# Patient Record
Sex: FEMALE | Race: WHITE | NOT HISPANIC OR LATINO
[De-identification: names, ages, dates, MRNs, and addresses within clinical notes are randomized per-mention and may not be internally consistent; named-entity substitution may affect disease eponyms.]

---
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
## 2022-11-23 ENCOUNTER — APPOINTMENT (OUTPATIENT)
Dept: GASTROENTEROLOGY | Facility: CLINIC | Age: 22
End: 2022-11-23

## 2022-11-23 VITALS
DIASTOLIC BLOOD PRESSURE: 80 MMHG | SYSTOLIC BLOOD PRESSURE: 102 MMHG | HEART RATE: 101 BPM | BODY MASS INDEX: 15.95 KG/M2 | OXYGEN SATURATION: 98 % | RESPIRATION RATE: 12 BRPM | TEMPERATURE: 98.5 F | WEIGHT: 90 LBS | HEIGHT: 63 IN

## 2022-11-23 DIAGNOSIS — R89.4 ABNORMAL IMMUNOLOGICAL FINDINGS IN SPECIMENS FROM OTHER ORGANS, SYSTEMS AND TISSUES: ICD-10-CM

## 2022-11-23 PROCEDURE — 99215 OFFICE O/P EST HI 40 MIN: CPT

## 2022-11-23 RX ORDER — NORETHINDRONE ACETATE AND ETHINYL ESTRADIOL, ETHINYL ESTRADIOL AND FERROUS FUMARATE 1MG-10(24)
1 MG-10 MCG / KIT ORAL
Refills: 0 | Status: COMPLETED | COMMUNITY
End: 2022-11-23

## 2022-11-23 NOTE — HISTORY OF PRESENT ILLNESS
Name: Stephane Rios  YOB: 1944  Gender: female  MRN: [id number]    CC:   Chief Complaint   Patient presents with    Shoulder Pain     Recheck left shoulder        HPI: Patient comes in for recheck of left shoulder pain. Patient had biceps tendon injection on 9-2-2022 and notes 3-4 weeks relief with injection. Patient has also previously had a subacromial injection on 7-9-2022 with only minor relief. Her last visit she also noted some cervical spine pain with history of neck surgery. The patient notes posterior and lateral pain. Denies popping, clicking, numbness and tingling. Allergies   Allergen Reactions    Fire Ant Anaphylaxis    Amoxicillin Other (See Comments)     C-Diff    Amoxicillin-Pot Clavulanate Other (See Comments)     c.diff    Augmentin [Amoxicillin-Pot Clavulanate]     Cephalexin Other (See Comments)     C Diff      Cephalexin Other (See Comments)     C-Diff  C-Diff      Cephalosporins     Hydromorphone Other (See Comments)     Keeps her awake    Meperidine Hcl     Morphine Other (See Comments)     Awake  Awake  No sleep    Oxycodone     Oxycodone-Acetaminophen Other (See Comments)     Jittery  Nightmares    Oxycodone-Aspirin Other (See Comments)     Jittery  Other reaction(s): Unknown-Unspecified  Jittery    Penicillins Other (See Comments)     C-Diff      Povidone Iodine     Povidone-Iodine Other (See Comments)     Johnston  Johnston  burn    Adhesive Tape Itching and Rash     \"sticky ace wrap\"  \"sticky ace wrap\"    Meperidine Rash     Past Medical History:   Diagnosis Date    Acute kidney insufficiency     Followed by PCP     Adverse effect of anesthesia     2016 in [place] heart stopped at the end of my gallbladder surgery. \" Patient reports no CPR performed, stayed one night in the hospital. No problems with any other surgeries.     Allergic rhinitis     Anemia     history     Anxiety and depression     Controlled with medication     Arthritis     Edema     bilateral lower legs     GERD (gastroesophageal reflux disease)     Controlled with medication     History of chicken pox     History of Clostridioides difficile infection     History of DVT (deep vein thrombosis)     s/p knee replacement and developed right leg DVT     HTN (hypertension)     controlled with medication     Hyperlipidemia     controlled with medication     Hyperlipidemia     Hypertension     Hyperthyroidism     pt denies    Hypothyroidism     controlled with medication     Hypothyroidism     IBS (irritable bowel syndrome)     Insomnia     Left flank pain 3/5/2020    Morbid obesity ([facility] [address]     BMI 42.85    Murmur     per PCP note (26/0/71) systolic murmur grade of 2/6    Overactive bladder     PAD (peripheral artery disease) ([facility] [address]     patient denies on 11/27/19    Recurrent UTI     Restless leg     RLS (restless legs syndrome)     controlled with medication     SOBOE (shortness of breath on exertion)     PRN inhaler-patient reports she uses inhaler QHS    Special examinations     History-Neurologist - Dr. Larry OCONNOR (stress urinary incontinence, female)     Thromboembolus ([facility] [address] 2015    behind right knee after total knee replacement    Tubulovillous adenoma of colon 04/26/2021     Past Surgical History:   Procedure Laterality Date    APPENDECTOMY  1955    CHOLECYSTECTOMY  12/2016    COLONOSCOPY      COLONOSCOPY      2013, 2003    GYN  12/03/2019    suburethral sling and cystoscopy    HYSTERECTOMY, TOTAL ABDOMINAL (CERVIX REMOVED)      1970    IR GENERIC PROCEDURE      2014    KNEE SURGERY Left     4/2016    KNEE SURGERY Left 2014    LAP BAND  REMOVAL 3/16/2015    ORTHOPEDIC SURGERY Left 01/2020    left hand tendon surgery    ORTHOPEDIC SURGERY Left     LEFT WRIST TRAPEZIOMETACARPAL ARTHROPLASTY WITH FCR TENDON TRANSFER- 2020    OTHER SURGICAL HISTORY Right 2016    Hand    OTHER SURGICAL HISTORY      2014    OTHER SURGICAL HISTORY      1986    OTHER SURGICAL HISTORY  12/03/2019    bladder surgery-put mesh in SHOULDER ARTHROSCOPY      1966,     TONSILLECTOMY AND ADENOIDECTOMY      1950    TOTAL KNEE ARTHROPLASTY Right 2014     Family History   Problem Relation Age of Onset    Other Sister     Heart Disease Brother     No Known Problems Mother     Other Father 54        brain aneurysm    Cerebral Aneurysm Father     Congenial Anomalies Sister         right arm deformity    Pneumonia Sister     Other Sister         fibromyalgia    Hypertension Brother     Heart Disease Brother     Other Brother         brain aneurysm, AVM    Heart Surgery Brother         CABGx4    Heart Attack Maternal Grandmother     Cancer Maternal Grandfather         skin    Heart Attack Paternal Grandfather     Mult Sclerosis Daughter     Drug Abuse Son     Other Son         Hepatitis C     Social History     Socioeconomic History    Marital status:      Spouse name: Aydin Katz    Number of children: 2    Years of education: Not on file    Highest education level: Not on file   Occupational History    Occupation: Previously worked as a [employer]    Tobacco Use    Smoking status: Former     Packs/day: 1.00     Years: 46.00     Pack years: 46.00     Types: Cigarettes     Quit date: 2005     Years since quittin.8    Smokeless tobacco: Never   Vaping Use    Vaping Use: Unknown   Substance and Sexual Activity    Alcohol use: No     Alcohol/week: 0.0 standard drinks    Drug use: No    Sexual activity: Not on file   Other Topics Concern    Not on file   Social History Narrative    ** Merged History Encounter **         Patient has 1 dog sleeps with her occasionally. Patient currently drinks 1 caffeine drink per day.      Social Determinants of Health     Financial Resource Strain: Not on file   Food Insecurity: Not on file   Transportation Needs: Not on file   Physical Activity: Not on file   Stress: Not on file   Social Connections: Not on file   Intimate Partner Violence: Not on file   Housing Stability: Not on file        No flowsheet [FreeTextEntry1] : The patient comes concerned that she is losing weight.  She is being very mindful about avoiding gluten.  She denies heartburn, odynophagia, dysphagia or satiety.  She is moving her bowels on a regular basis without rectal bleeding.  She has no rashes.  She occasionally has headaches but less so than prior to her gluten-free diet. [de-identified] : The patient moves bowels daily. Denies any abdominal pain, constipation, diarrhea, bright red blood per rectum.  She has been having trouble gaining weight and went for celiac's through antibody testing that were positive.  She has been on a gluten-free diet, gaining weight with improved skin quality.\par \par No heartburn, odynophagia, dysphagia or early satiety.\par \par No history of anemia or abnormal liver enzymes.\par \par Due for screening colonoscopy. data found. Review of Systems  Non-contributory    PE left shoulder:      Left shoulder is examined. She has lost some external rotation. A little bit of tightness with flexion extension. Strength is appropriate. Recall Recall MRI left shoulder performed 7/5/22:     Narrative   MRI OF THE LEFT SHOULDER WITHOUT CONTRAST. Clinical Indication: Left shoulder pain with limited range of motion       Procedure: Multiplanar and multisequence MR images of the left shoulder were   performed without gadolinium contrast.       Comparison: No prior       Findings:        AC joint: Mild degenerative hypertrophy is noted of the AC joint. There is trace   fluid signal the subacromial/subdeltoid bursa. Rotator cuff: The supraspinatus, infraspinatus, teres minor, and subscapularis   tendons are intact. No rotator cuff tendon tear evident. Biceps labral complex: The long head of biceps tendon is intact. The superior   labrum is intact. The joint capsule in axillary recess is intact. Glenohumeral joint: The marrow signal in the humeral head and glenoid is   unremarkable. No definite anterior or posterior labral tear present. There is   mild degenerative chondral fissuring along the central glenoid. A small joint   effusion with synovitis is present. No abnormal soft tissue mass or fatty atrophy. Impression   1. Mild osteoarthritic chondral changes along the glenoid   2. Small glenohumeral joint effusion with synovitis. 3. The rotator cuff tendon is intact. 4. Mild degenerative hypertrophy the [facility] joint with underlying   subacromial/subdeltoid bursitis. MRI reviewed, AC OA noticed. No RTC tear. Synovitis noted in the axillary space on coronal image 10. A/Plan:     ICD-10-CM    1. Left shoulder pain, unspecified chronicity  M25.512       2. Tear of left rotator cuff, unspecified tear extent, unspecified whether traumatic  M75.102       3.  Tendinopathy of left biceps  [id number]            We discussed multiple exercises to continue to work on including stretching. She has some lack of motion. Discussed if she is at therapy with her  asked them for some exercises as well. She said she had 4 weeks of 100% relief with the last injection. Also discussed appropriate use of NSAIDs with her orally or topically. I think topically would probably work better so she will try this over the next week. Return in about 6 weeks (around 12/2/2022).         Carrie Paulino MD  10/21/22

## 2022-11-23 NOTE — PHYSICAL EXAM
[General Appearance - Alert] : alert [General Appearance - In No Acute Distress] : in no acute distress [Sclera] : the sclera and conjunctiva were normal [PERRL With Normal Accommodation] : pupils were equal in size, round, and reactive to light [Extraocular Movements] : extraocular movements were intact [Outer Ear] : the ears and nose were normal in appearance [Neck Appearance] : the appearance of the neck was normal [Thyroid Diffuse Enlargement] : the thyroid was not enlarged [Thyroid Nodule] : there were no palpable thyroid nodules [Auscultation Breath Sounds / Voice Sounds] : lungs were clear to auscultation bilaterally [Heart Rate And Rhythm] : heart rate was normal and rhythm regular [Heart Sounds Gallop] : no gallops [Heart Sounds] : normal S1 and S2 [Murmurs] : no murmurs [Heart Sounds Pericardial Friction Rub] : no pericardial rub [Edema] : there was no peripheral edema [Bowel Sounds] : normal bowel sounds [Abdomen Soft] : soft [Abdomen Tenderness] : non-tender [] : no hepato-splenomegaly [Abdomen Mass (___ Cm)] : no abdominal mass palpated [Cervical Lymph Nodes Enlarged Posterior Bilaterally] : posterior cervical [Cervical Lymph Nodes Enlarged Anterior Bilaterally] : anterior cervical [No CVA Tenderness] : no ~M costovertebral angle tenderness [No Spinal Tenderness] : no spinal tenderness [Nail Clubbing] : no clubbing  or cyanosis of the fingernails [Abnormal Walk] : normal gait [Musculoskeletal - Swelling] : no joint swelling seen [Motor Tone] : muscle strength and tone were normal [Skin Color & Pigmentation] : normal skin color and pigmentation [No Focal Deficits] : no focal deficits [Impaired Insight] : insight and judgment were intact [Oriented To Time, Place, And Person] : oriented to person, place, and time [Affect] : the affect was normal

## 2022-11-23 NOTE — ASSESSMENT
[FreeTextEntry1] : My impression is that of a young woman having difficulty gaining weight but without any other symptoms with celiac sprue, despite carefully avoiding gluten.\par \par I have spent a great deal of time discussing the role of daily high-intensity exercise with the patient. We discussed behavior modification strategies to institute this habit.   I have discussed nutrition in great detail including consuming vegetable fibers on a daily basis and limiting simple carbohydrates 5 days per week.  We have also reviewed the benefits of soluble fiber supplementation, including (but not limited to), favorable effects on lipid profile, weight control and the salutary effects on colonic microbiota. We reviewed the effects of such daily habits on metabolism and the metabolic set point resulting in a healthy weight, decreased pain sensitivity (effecting the GI tract), bowel habits and other aspects of health.  I recommended a mindful meditation practice and reviewed behavior modification strategies.\par \par Celiac sprue antibodies today.\par \par I have asked the patient to schedule an upper endoscopy in the near future. I have reviewed the risks, benefits and alternatives, answered all questions and provided the patient with literature to read.

## 2022-11-28 LAB
GLIADIN IGA SER QL: 19.3 UNITS
GLIADIN IGG SER QL: 7.4 UNITS
GLIADIN PEPTIDE IGA SER-ACNC: NEGATIVE
GLIADIN PEPTIDE IGG SER-ACNC: NEGATIVE
IGA SER QL IEP: 124 MG/DL
TTG IGA SER IA-ACNC: 12.5 U/ML
TTG IGA SER-ACNC: POSITIVE
TTG IGG SER IA-ACNC: 14.6 U/ML
TTG IGG SER IA-ACNC: POSITIVE

## 2022-11-30 LAB
ENDOMYSIUM IGA SER QL: NEGATIVE
ENDOMYSIUM IGA TITR SER: NORMAL

## 2025-03-18 ENCOUNTER — APPOINTMENT (OUTPATIENT)
Dept: OBGYN | Facility: CLINIC | Age: 25
End: 2025-03-18

## 2025-04-25 ENCOUNTER — APPOINTMENT (OUTPATIENT)
Dept: OBGYN | Facility: CLINIC | Age: 25
End: 2025-04-25